# Patient Record
Sex: MALE | Race: OTHER | HISPANIC OR LATINO
[De-identification: names, ages, dates, MRNs, and addresses within clinical notes are randomized per-mention and may not be internally consistent; named-entity substitution may affect disease eponyms.]

---

## 2021-04-12 ENCOUNTER — TRANSCRIPTION ENCOUNTER (OUTPATIENT)
Age: 46
End: 2021-04-12

## 2021-09-08 ENCOUNTER — EMERGENCY (EMERGENCY)
Facility: HOSPITAL | Age: 46
LOS: 1 days | Discharge: HOME | End: 2021-09-08
Admitting: EMERGENCY MEDICINE
Payer: COMMERCIAL

## 2021-09-08 VITALS
RESPIRATION RATE: 17 BRPM | OXYGEN SATURATION: 100 % | HEART RATE: 69 BPM | SYSTOLIC BLOOD PRESSURE: 143 MMHG | TEMPERATURE: 98 F | DIASTOLIC BLOOD PRESSURE: 88 MMHG

## 2021-09-08 PROCEDURE — 99284 EMERGENCY DEPT VISIT MOD MDM: CPT

## 2021-09-08 RX ORDER — LORATADINE 10 MG/1
10 TABLET ORAL DAILY
Refills: 0 | Status: DISCONTINUED | OUTPATIENT
Start: 2021-09-08 | End: 2021-09-12

## 2021-09-08 RX ORDER — LORATADINE 10 MG/1
1 TABLET ORAL
Qty: 10 | Refills: 0
Start: 2021-09-08 | End: 2021-09-17

## 2021-09-08 RX ORDER — DIPHENHYDRAMINE HCL 50 MG
1 CAPSULE ORAL
Qty: 28 | Refills: 0
Start: 2021-09-08 | End: 2021-09-14

## 2021-09-08 RX ORDER — DIPHENHYDRAMINE HCL 50 MG
25 CAPSULE ORAL ONCE
Refills: 0 | Status: COMPLETED | OUTPATIENT
Start: 2021-09-08 | End: 2021-09-08

## 2021-09-08 RX ADMIN — LORATADINE 10 MILLIGRAM(S): 10 TABLET ORAL at 22:04

## 2021-09-08 RX ADMIN — Medication 125 MILLIGRAM(S): at 22:05

## 2021-09-08 RX ADMIN — Medication 25 MILLIGRAM(S): at 22:05

## 2021-09-08 NOTE — ED ADULT NURSE NOTE - OBJECTIVE STATEMENT
Received pt in intake room 13. pt A&OX3, ambulatory. pt with no PMH coming into the ED c/o worsening rash. pt exposed to poison ivy 2 weeks ago, rash spreading to entire body. prescribed prednisone 40mg daily and discontinued med 2 days ago. pt appears to be in no distress. respirations are equal and nonlabored, no respiratory distress noted. denies any cp, sob, cough, fever/chills, headache, dizziness. pt stable, medicated as per orders. awaiting further plan. will reassess and monitor.

## 2021-09-08 NOTE — ED PROVIDER NOTE - NSFOLLOWUPCLINICS_GEN_ALL_ED_FT
[facility]  Dermatology  [address]  [place] [address]  Phone: [phone]  Fax: [phone]    [facility]  Dermatology  [address] [place] [address]  Phone: [phone]  Fax: [phone]    [facility]  Allergy  [address] [place] [id number]  Phone: [phone]  Fax:     [facility]  Dermatology  [address] [place] [address], [address]  [place] [address]  Phone: [phone]  Fax:

## 2021-09-08 NOTE — ED PROVIDER NOTE - NSFOLLOWUPINSTRUCTIONS_ED_ALL_ED_FT
Advance activity as tolerated.  Continue all previously prescribed medications as directed unless otherwise instructed.  Follow up with your primary care physician in 48-72 hours- bring copies of your results.  Return to the ER for worsening or persistent symptoms, and/or ANY NEW OR CONCERNING SYMPTOMS. If you have issues obtaining follow up, please call: [phone] to obtain a doctor or specialist who takes your insurance in your area.  You may call [phone] to make an appointment with the internal medicine clinic. Advance activity as tolerated.  Continue all previously prescribed medications as directed unless otherwise instructed.  Follow up with your primary care physician in 48-72 hours- bring copies of your results.  Return to the ER for worsening or persistent symptoms, and/or ANY NEW OR CONCERNING SYMPTOMS. If you have issues obtaining follow up, please call: [phone] to obtain a doctor or specialist who takes your insurance in your area.  You may call [phone] to make an appointment with the internal medicine clinic.    If rash worsens return to the nearest ER immediately, if any lip/tongue swelling or difficulty breathing return to the ER immediately. Take all meds as prescribed. If any fevers or chills, or pus/warmth/redness at site of rash return to the ED as this is sign of infection. Follow up with Derm and Allergy.

## 2021-09-08 NOTE — ED PROVIDER NOTE - OBJECTIVE STATEMENT
45yo M w/ no significant pmhx presents w/ worsening rash. Pt exposed to poison ivy 2 weeks ago, now w/ diffuse rash to whole body, multiple sites of crusting/weeping redness/rash. Was prescribed Prednisone 40mgs daily, 8 day taper, discontinued 2 days ago, rash worsened, then took 40mgs yesterday. Denies fevers, chills, ha, neck stiffness, cp. Pt very anxious states he feels short of breath.

## 2021-09-08 NOTE — ED ADULT TRIAGE NOTE - CHIEF COMPLAINT QUOTE
Patient had an allergic reaction 2 weeks ago with poison ivy and was taking Prednisone. Stopped Prednisone 2 days ago. Now he has rash all over his body, he feels like he has labored breathing as well. Patient had an allergic reaction 2 weeks ago to poison ivy and was taking Prednisone. Stopped Prednisone 2 days ago. Now he has rash all over his body, he feels like he has labored breathing as well. Patient breathing normal and tongue at this time is not involved. Will continue to monitor.

## 2021-09-08 NOTE — ED PROVIDER NOTE - NSFOLLOWUPCLINICSTOKEN_GEN_ALL_ED_FT
[id number]: || ||00\01||False;[id number]: || ||00\01||False;[id number]: || ||00\01||False;[id number]: || ||00\01||False;

## 2021-09-08 NOTE — ED PROVIDER NOTE - PATIENT PORTAL LINK FT
You can access the FollowMyHealth Patient Portal offered by [facility] by registering at the following website: [email or web address]. By joining [employer]’s FollowMyHealth portal, you will also be able to view your health information using other applications (apps) compatible with our system.

## 2021-09-08 NOTE — ED ADULT NURSE NOTE - CHIEF COMPLAINT QUOTE
Patient had an allergic reaction 2 weeks ago to poison ivy and was taking Prednisone. Stopped Prednisone 2 days ago. Now he has rash all over his body, he feels like he has labored breathing as well. Patient breathing normal and tongue at this time is not involved. Will continue to monitor.